# Patient Record
(demographics unavailable — no encounter records)

---

## 2024-11-11 NOTE — HISTORY OF PRESENT ILLNESS
[de-identified] : 79 yo female following up for her low back pain and left lower extremity radiculopathy to her lateral calf. At last visit she was referred to pain management for further conservative management. Patient reports signficant symptomatic relief from previous visit. Patient has had an injection before she went on vacation that provided significant symptomatic relief.   8/28/2024 Patient is a 78-year-old female who presents for initial eval of LT sided bp and LT lateral calf pain, for about 3 years. States sxs are released in supine position. Reports weakness in LE.

## 2024-11-11 NOTE — ASSESSMENT
[FreeTextEntry1] :  I had a lengthy discussion with the patient in regards to treatment plan and diagnosis. There are no red flag findings on imaging nor are there any red flag findings on clinical exams.  Therefore we will proceed with a course of conservative treatment. This would include physical therapy/home exercise program, Tylenol, NSAIDs as medically indicated.  The patient will follow up as needed.  I encouraged the patient to follow-up sooner if there are any new or worsening symptoms.

## 2024-11-11 NOTE — PHYSICAL EXAM
[de-identified] : Lumbar Physical Exam   Gait - Normal  Station - Normal   Sagittal balance - Normal   Compensatory mechanism? - None   Difficulty with toe/heel walk    Reflexes    Patellar - normal    Gastroc - normal    Clonus - No    Hip Exam - Normal   Straight leg raise - none   Pulses - 2+ dp/pt   Range of motion - normal    Sensation   Sensation intact to light touch in L1, L2, L3, L4, L5 and S1 dermatomes bilaterally     Motor             IP Quad HS TA Gastroc EHL   Right 5/5  5/5   5/5 5/5    5/5     5/5  Left   5/5 5/5   5/5 5/5    5/5      5/5  [de-identified] : previous imaging Scoliosis Rads  SVA wnl  L3-4, L4-5, L5-S1 grade 1 anterolisthesis  Lumbar MRI:  severe LT foraminal stenosis L4-L5, L5-S1

## 2024-11-11 NOTE — ADDENDUM
[FreeTextEntry1] :  I, Mary Reaves, acted solely as a scribe for Dr. Ramone Hudson MD on this date 11/11/2024    All medical record entries made by the Scribe were at my, Dr. Ramone Hudson MD., direction and personally dictated by me on 11/11/2024 . I have reviewed the chart and agree that the record accurately reflects my personal performance of the history, physical exam, assessment and plan. I have also personally directed, reviewed, and agreed with the chart.

## 2024-11-13 NOTE — PROCEDURE
[de-identified] : Euflexxa # 1 Bilateral knee Discussed at length with the patient the planned Euflexxa injection. The risks, benefits, convalescence and alternatives were reviewed. The possible side effects discussed included but were not limited to: pain, swelling, heat and redness. There symptoms are generally mild but if they are extensive then contact the office. Giving pain relievers by mouth such as NSAIDs or Tylenol can generally treat the reactions to Euflexxa. Rare cases of infection have been noted. Rash, hives and itching may occur post injection. If you have muscle pain or cramps, flushing and or swelling of the face, rapid heart beat, nausea, dizziness, fever, chills, headache, difficulty breathing, swelling in the arms or legs, or have a prickly feeling of your skin, contact a health care provider immediately.  Following this discussion, the knee was prepped with betadine and under sterile condition the Euflexxa injection was performed with a 22 gauge needle. The needle was introduced into the joint, aspiration was performed to ensure intra-articular placement and the medication was injected. Upon withdrawal of the needle the site was cleaned with alcohol and a bandaid applied. The patient tolerated the injection well and there were no adverse effects. Post injection instructions included no strenuous activity for 24 hours, cryotherapy and if there are any adverse effects to contact the office.

## 2024-11-20 NOTE — PROCEDURE
[de-identified] : Euflexxa # 2 Bilateral Knee Discussed at length with the patient the planned Euflexxa injection. The risks, benefits, convalescence and alternatives were reviewed. The possible side effects discussed included but were not limited to: pain, swelling, heat and redness. There symptoms are generally mild but if they are extensive then contact the office. Giving pain relievers by mouth such as NSAIDs or Tylenol can generally treat the reactions to Euflexxa. Rare cases of infection have been noted. Rash, hives and itching may occur post injection. If you have muscle pain or cramps, flushing and or swelling of the face, rapid heart beat, nausea, dizziness, fever, chills, headache, difficulty breathing, swelling in the arms or legs, or have a prickly feeling of your skin, contact a health care provider immediately.  Following this discussion, the knee was prepped with betadine and under sterile condition the Euflexxa injection was performed with a 22 gauge needle. The needle was introduced into the joint, aspiration was performed to ensure intra-articular placement and the medication was injected. Upon withdrawal of the needle the site was cleaned with alcohol and a bandaid applied. The patient tolerated the injection well and there were no adverse effects. Post injection instructions included no strenuous activity for 24 hours, cryotherapy and if there are any adverse effects to contact the office.

## 2024-12-02 NOTE — PROCEDURE
[de-identified] : Euflexxa #3 bilateral knees Discussed at length with the patient the planned Euflexxa injection. The risks, benefits, convalescence and alternatives were reviewed. The possible side effects discussed included but were not limited to: pain, swelling, heat and redness. There symptoms are generally mild but if they are extensive then contact the office. Giving pain relievers by mouth such as NSAIDs or Tylenol can generally treat the reactions to Euflexxa. Rare cases of infection have been noted. Rash, hives and itching may occur post injection. If you have muscle pain or cramps, flushing and or swelling of the face, rapid heart beat, nausea, dizziness, fever, chills, headache, difficulty breathing, swelling in the arms or legs, or have a prickly feeling of your skin, contact a health care provider immediately.  Following this discussion, the knee was prepped with betadine and under sterile condition the Euflexxa injection was performed with a 22 gauge needle. The needle was introduced into the joint, aspiration was performed to ensure intra-articular placement and the medication was injected. Upon withdrawal of the needle the site was cleaned with alcohol and a bandaid applied. The patient tolerated the injection well and there were no adverse effects. Post injection instructions included no strenuous activity for 24 hours, cryotherapy and if there are any adverse effects to contact the office.

## 2025-05-30 NOTE — HISTORY OF PRESENT ILLNESS
[de-identified] :  ANAY COVARRUBIAS 79 year old female presents for follow up evaluation of BL knee pain with the right being more symptomatic than the left. She is undergoing a non-operative treatment plan. She had BL cortisone injections in September last year and Euflexxa in November last year. She states she is trying to avoid medications since they upset her stomach She uses Voltaren gel before bed. She has gone to physical therapy in the past, but now just exercises on her own at home.

## 2025-05-30 NOTE — DISCUSSION/SUMMARY
[de-identified] : Discussed at length the nature of the patient's condition. Their BL knee symptoms appear secondary to severe degenerative arthritis. I have reviewed operative vs. non-operative treatment plans with the patient. She would like to have a staged BL TR with the right being done first. She plans to have it done after the summer. In the interim, due to her symptoms, her acute pain was treated today with BL cortisone injections. She will schedule her gel injections for the future as well.   Patient can continue home exercises, Tylenol, weight management and activities as tolerated. All questions were answered, understanding verbalized. Patient is in agreement with plan of treatment. Patient may follow up as needed.

## 2025-05-30 NOTE — PHYSICAL EXAM
[de-identified] : General appearance: well-nourished and hydrated, pleasant, alert and oriented x 3, cooperative. HEENT: Normocephalic, EOM intact, Nasal septum midline, Oral cavity clear, External auditory canal clear. Cardiovascular: no apparent abnormalities, no lower leg edema, no varicosities, pedal pulses are palpable. Lymphatics Lymph nodes: none palpated, Lymphedema: not present. Neurologic: sensation is normal, no muscle weakness in upper or lower extremities, patella tendon reflexes intact. Dermatologic no apparent skin lesions, moist, warm, no rash. Spine: tenderness of the LS spine and limited mobility  Gait: nonantalgic.   Left knee Inspection: mild-moderate effusion. Wounds: none. Alignment: normal. Palpation: no specific tenderness on palpation. ROM active (in degrees): 0-90 Ligamentous laxity: all ligaments appear stable,, negative ant. drawer test, negative post. drawer test, stable to varus stress test, stable to valgus stress test. negative Lachman's test, negative pivot shift test Meniscal Test: negative McMurrays, negative Dayna. Patellofemoral Alignment Test: Q angle-, normal. Muscle Test: good quad strength. Leg examination: calf is soft and non-tender.   Right knee Inspection: trace effusion. Wounds: none. Alignment: normal. Palpation: no specific tenderness on palpation. ROM active (in degrees): 0-90 with pain and crepitus through the arc of motion  Ligamentous laxity: all ligaments appear stable, negative ant. drawer test, negative post. drawer test, stable to varus stress test, stable to valgus stress test. negative Lachman's test, negative pivot shift test Meniscal Test: negative McMurrays, negative Dayna. Patellofemoral Alignment Test: Q angle-, normal. Muscle Test: good quad strength. Leg examination: calf soft and non tender. [de-identified] : Left knee x-rays, standing AP/Lateral and Merchant films, and 45-degree PA standing view, taken at the office today shows normal alignment, femoral tibial joint space maintained, lateral osteophytes, bone on bone sclerosis, patella at appropriate height and central position, patellofemoral joint space narrowing, Kellgren and Earnest grade 2 with severe patellofemoral arthritis.   Right knee x-rays, standing AP/Lateral and Merchant films, and 45-degree PA standing view, taken at the office today shows degenerative arthritis, normal alignment, small osteophytes, bone on bone sclerosis especially on the Limon view, patella at appropriate height and central position, patellofemoral joint space narrowing, Kellgren and Earnest grade 3. Speckled calcification posterior vessels.

## 2025-05-30 NOTE — CONSULT LETTER
[Dear  ___] : Dear  [unfilled], [Consult Letter:] : I had the pleasure of evaluating your patient, [unfilled]. [Please see my note below.] : Please see my note below. [Consult Closing:] : Thank you very much for allowing me to participate in the care of this patient.  If you have any questions, please do not hesitate to contact me. [Sincerely,] : Sincerely, [FreeTextEntry2] : DANIEL FLOREZ

## 2025-05-30 NOTE — ADDENDUM
[FreeTextEntry1] : This note was written by Dg Bell on 05/30/2025 acting as scribe for Dr. Marco Dill M.D.  I, Dr. Marco Dill, have read and attest that all the information, medical decision making and discharge instructions within are true and accurate.

## 2025-05-30 NOTE — PROCEDURE
[de-identified] : BILATERAL KNEE CORTISONE INJECTION Discussed at length with the patient the planned steroid and lidocaine injection. The risks, benefits, convalescence and alternatives were reviewed. The possible side effects discussed included but were not limited to: pain, swelling, heat and redness. These symptoms are generally mild but if they are extensive then contact the office. Giving pain relievers by mouth such as NSAIDs or Tylenol can generally treat the reactions to steroid and lidocaine. Rare cases of infection have been noted. Rash, hives and itching may occur post injection. If you have muscle pain or cramps, flushing and or swelling of the face, rapid heart beat, nausea, dizziness, fever, chills, headache, difficulty breathing, swelling in the arms or legs, or have a prickly feeling of your skin, contact a health care provider immediately. Following this discussion, the knee was prepped with betadine and under sterile conditions 5 cc of 2% lidocaine and 1 cc depo-medrol (40mg) were injected with a 21 gauge needle. The needle was introduced into the joint, aspiration was performed to ensure intra-articular placement and the medication was injected. Upon withdrawal of the needle the site was cleaned with alcohol and a bandaid applied. The patient tolerated the injection well and there were no adverse effects. Post injection instructions included no strenuous activity for 24 hours, cryotherapy and if there are any adverse effects to contact the office.

## 2025-06-11 NOTE — PROCEDURE
[de-identified] : Euflexxa # 1 Bilateral knee Discussed at length with the patient the planned Euflexxa injection. The risks, benefits, convalescence and alternatives were reviewed. The possible side effects discussed included but were not limited to: pain, swelling, heat and redness. There symptoms are generally mild but if they are extensive then contact the office. Giving pain relievers by mouth such as NSAIDs or Tylenol can generally treat the reactions to Euflexxa. Rare cases of infection have been noted. Rash, hives and itching may occur post injection. If you have muscle pain or cramps, flushing and or swelling of the face, rapid heart beat, nausea, dizziness, fever, chills, headache, difficulty breathing, swelling in the arms or legs, or have a prickly feeling of your skin, contact a health care provider immediately.  Following this discussion, the knee was prepped with betadine and under sterile condition the Euflexxa injection was performed with a 22 gauge needle. The needle was introduced into the joint, aspiration was performed to ensure intra-articular placement and the medication was injected. Upon withdrawal of the needle the site was cleaned with alcohol and a bandaid applied. The patient tolerated the injection well and there were no adverse effects. Post injection instructions included no strenuous activity for 24 hours, cryotherapy and if there are any adverse effects to contact the office.

## 2025-06-11 NOTE — PROCEDURE
[de-identified] : Euflexxa # 1 Bilateral knee Discussed at length with the patient the planned Euflexxa injection. The risks, benefits, convalescence and alternatives were reviewed. The possible side effects discussed included but were not limited to: pain, swelling, heat and redness. There symptoms are generally mild but if they are extensive then contact the office. Giving pain relievers by mouth such as NSAIDs or Tylenol can generally treat the reactions to Euflexxa. Rare cases of infection have been noted. Rash, hives and itching may occur post injection. If you have muscle pain or cramps, flushing and or swelling of the face, rapid heart beat, nausea, dizziness, fever, chills, headache, difficulty breathing, swelling in the arms or legs, or have a prickly feeling of your skin, contact a health care provider immediately.  Following this discussion, the knee was prepped with betadine and under sterile condition the Euflexxa injection was performed with a 22 gauge needle. The needle was introduced into the joint, aspiration was performed to ensure intra-articular placement and the medication was injected. Upon withdrawal of the needle the site was cleaned with alcohol and a bandaid applied. The patient tolerated the injection well and there were no adverse effects. Post injection instructions included no strenuous activity for 24 hours, cryotherapy and if there are any adverse effects to contact the office.

## 2025-06-18 NOTE — PROCEDURE
[de-identified] : Euflexxa # 2 Bilateral knee Discussed at length with the patient the planned Euflexxa injection. The risks, benefits, convalescence and alternatives were reviewed. The possible side effects discussed included but were not limited to: pain, swelling, heat and redness. There symptoms are generally mild but if they are extensive then contact the office. Giving pain relievers by mouth such as NSAIDs or Tylenol can generally treat the reactions to Euflexxa. Rare cases of infection have been noted. Rash, hives and itching may occur post injection. If you have muscle pain or cramps, flushing and or swelling of the face, rapid heart beat, nausea, dizziness, fever, chills, headache, difficulty breathing, swelling in the arms or legs, or have a prickly feeling of your skin, contact a health care provider immediately.  Following this discussion, the knee was prepped with betadine and under sterile condition the Euflexxa injection was performed with a 22 gauge needle. The needle was introduced into the joint, aspiration was performed to ensure intra-articular placement and the medication was injected. Upon withdrawal of the needle the site was cleaned with alcohol and a bandaid applied. The patient tolerated the injection well and there were no adverse effects. Post injection instructions included no strenuous activity for 24 hours, cryotherapy and if there are any adverse effects to contact the office.

## 2025-06-25 NOTE — PROCEDURE
[de-identified] : Euflexxa # 3 Bilateral knee Discussed at length with the patient the planned Euflexxa injection. The risks, benefits, convalescence and alternatives were reviewed. The possible side effects discussed included but were not limited to: pain, swelling, heat and redness. There symptoms are generally mild but if they are extensive then contact the office. Giving pain relievers by mouth such as NSAIDs or Tylenol can generally treat the reactions to Euflexxa. Rare cases of infection have been noted. Rash, hives and itching may occur post injection. If you have muscle pain or cramps, flushing and or swelling of the face, rapid heart beat, nausea, dizziness, fever, chills, headache, difficulty breathing, swelling in the arms or legs, or have a prickly feeling of your skin, contact a health care provider immediately.  Following this discussion, the knee was prepped with betadine and under sterile condition the Euflexxa injection was performed with a 22 gauge needle. The needle was introduced into the joint, aspiration was performed to ensure intra-articular placement and the medication was injected. Upon withdrawal of the needle the site was cleaned with alcohol and a bandaid applied. The patient tolerated the injection well and there were no adverse effects. Post injection instructions included no strenuous activity for 24 hours, cryotherapy and if there are any adverse effects to contact the office.

## 2025-07-28 NOTE — PROCEDURE
[de-identified] : LEFT KNEE CORTISONE INJECTION Discussed at length with the patient the planned steroid and lidocaine injection. The risks, benefits, convalescence and alternatives were reviewed. The possible side effects discussed included but were not limited to: pain, swelling, heat and redness. These symptoms are generally mild but if they are extensive then contact the office. Giving pain relievers by mouth such as NSAIDs or Tylenol can generally treat the reactions to steroid and lidocaine. Rare cases of infection have been noted. Rash, hives and itching may occur post injection. If you have muscle pain or cramps, flushing and or swelling of the face, rapid heart beat, nausea, dizziness, fever, chills, headache, difficulty breathing, swelling in the arms or legs, or have a prickly feeling of your skin, contact a health care provider immediately.   Following this discussion, the knee was prepped with betadine and under sterile conditions 5 cc of 2% lidocaine and 1 cc depo-medrol (40mg) were injected with a 21 gauge needle. The needle was introduced into the joint, aspiration was performed to ensure intra-articular placement and the medication was injected. Upon withdrawal of the needle the site was cleaned with alcohol and a bandaid applied. The patient tolerated the injection well and there were no adverse effects. Post injection instructions included no strenuous activity for 24 hours, cryotherapy and if there are any adverse effects to contact the office.

## 2025-07-28 NOTE — HISTORY OF PRESENT ILLNESS
[de-identified] : ANAY COVARRUBIAS 79 year old female presents for follow-up evaluation of B/L knee OA. She had cortisone in May following with Euflexxa in June. The pt is tentatively on the schedule for a right TKR on October 28 but inquiries about getting both knees done. She has HTN which she sees a cardiologist for. Current BMI is 28 and takes Tylenol prn. Of note, she has been having pain in B/L feet for 6 months as well as swelling in the left. She never had an injury and has not seen a foot specialist for this.

## 2025-07-28 NOTE — PHYSICAL EXAM
[de-identified] : General appearance: well-nourished and hydrated, pleasant, alert and oriented x 3, cooperative. HEENT: Normocephalic, EOM intact, Nasal septum midline, Oral cavity clear, External auditory canal clear. Cardiovascular: no apparent abnormalities, no lower leg edema, no varicosities, pedal pulses are palpable. Lymphatics Lymph nodes: none palpated, Lymphedema: not present. Neurologic: sensation is normal, no muscle weakness in upper or lower extremities, patella tendon reflexes intact. Dermatologic no apparent skin lesions, moist, warm, no rash. Spine: tenderness of the LS spine and limited mobility  Gait: nonantalgic.   Left knee Inspection: moderate effusion. Wounds: none. Alignment: normal. Palpation: medial, lateral, and peripatellar tenderness on palpation. ROM active (in degrees): 0-90 with pain and crepitus through arc of motion Ligamentous laxity: all ligaments appear stable,, negative ant. drawer test, negative post. drawer test, stable to varus stress test, stable to valgus stress test. negative Lachman's test, negative pivot shift test Meniscal Test: negative McMurrays, negative Dayna. Patellofemoral Alignment Test: Q angle-, normal. Muscle Test: good quad strength. Leg examination: calf is soft and non-tender.   Right knee Inspection: mild effusion. Wounds: none. Alignment: normal. Palpation: lateral and peripatellar tenderness on palpation. ROM active (in degrees): 0-90 with pain and crepitus  Ligamentous laxity: all ligaments appear stable, negative ant. drawer test, negative post. drawer test, stable to varus stress test, stable to valgus stress test. negative Lachman's test, negative pivot shift test Meniscal Test: negative McMurrays, negative Dayna. Patellofemoral Alignment Test: Q angle-, normal. Muscle Test: good quad strength. Leg examination: calf soft and non tender.     [de-identified] : Left knee x-rays, standing AP/Lateral and Merchant films, and 45-degree PA standing view, taken at the office today shows normal alignment, femoral and tibial joint space maintained, bone on bone sclerosis, patella at appropriate height and central position, patellofemoral joint space narrowing, osteophytes, with severe patellofemoral arthritis and calcification posterior vessels  Right knee x-rays, standing AP/Lateral and Merchant films, and 45-degree PA standing view, taken at the office today shows normal alignment, lateral joint space narrowing, marginal osteophytes, patella at appropriate height and central position, bone on bone sclerosis on the Limon view, patellofemoral joint space narrowing, peripheral osteophytes, Kellgren and Earnest grade 3-4 with patellofemoral arthritis and calcification posterior vessels

## 2025-07-28 NOTE — ADDENDUM
[FreeTextEntry1] : This note was written by Erickson Reyez on 07/28/2025 acting as scribe for Dr. Marco Dill M.D.   I, Dr. Marco Dill, have read and attest that all the information, medical decision making and discharge instructions within are true and accurate.

## 2025-07-28 NOTE — DISCUSSION/SUMMARY
[de-identified] : Discussed at length the nature of the patients condition. Their B/L knee symptoms appear secondary to degenerative arthritis and today she has an acute pain in her left knee. While in the future she may need a TKR, I explained to her that she is already scheduled for a right TKR which is more symptomatic. I also explained to her that due to her age, she is not a candidate for a B/L TKR and therefore we will do it as a staged B/L procedure with the right side first. For the acute pain patient was given a left knee cortisone injection today as detailed above for symptomatic relief. I also suggested PT, weight management, and Tylenol prn. They can continue activities as tolerated. She is on the schedule for a right TKR on October 28 and will need preop medical and cardiac clearance.  I will see her back in 6-8 weeks for reevaluation of her left knee. I have also referred her to Dr. Chinchilla for evaluation of her feet.